# Patient Record
Sex: FEMALE | Race: OTHER | HISPANIC OR LATINO | URBAN - METROPOLITAN AREA
[De-identification: names, ages, dates, MRNs, and addresses within clinical notes are randomized per-mention and may not be internally consistent; named-entity substitution may affect disease eponyms.]

---

## 2020-11-17 ENCOUNTER — OFFICE VISIT (OUTPATIENT)
Dept: URBAN - METROPOLITAN AREA CLINIC 60 | Facility: CLINIC | Age: 50
End: 2020-11-17

## 2020-12-11 ENCOUNTER — OFFICE VISIT (OUTPATIENT)
Dept: URBAN - METROPOLITAN AREA SURGERY CENTER 4 | Facility: SURGERY CENTER | Age: 50
End: 2020-12-11

## 2020-12-28 ENCOUNTER — OFFICE VISIT (OUTPATIENT)
Dept: URBAN - METROPOLITAN AREA CLINIC 63 | Facility: CLINIC | Age: 50
End: 2020-12-28

## 2020-12-28 ENCOUNTER — OFFICE VISIT (OUTPATIENT)
Dept: URBAN - METROPOLITAN AREA CLINIC 60 | Facility: CLINIC | Age: 50
End: 2020-12-28

## 2021-03-19 ENCOUNTER — OFFICE VISIT (OUTPATIENT)
Dept: URBAN - METROPOLITAN AREA SURGERY CENTER 4 | Facility: SURGERY CENTER | Age: 51
End: 2021-03-19

## 2021-03-23 LAB — PATHOLOGY (INDENTED REPORT): (no result)

## 2021-04-01 ENCOUNTER — OFFICE VISIT (OUTPATIENT)
Dept: URBAN - METROPOLITAN AREA CLINIC 60 | Facility: CLINIC | Age: 51
End: 2021-04-01

## 2021-04-19 ENCOUNTER — LAB OUTSIDE AN ENCOUNTER (OUTPATIENT)
Dept: URBAN - METROPOLITAN AREA CLINIC 121 | Facility: CLINIC | Age: 51
End: 2021-04-19

## 2021-04-22 ENCOUNTER — OFFICE VISIT (OUTPATIENT)
Dept: URBAN - METROPOLITAN AREA CLINIC 60 | Facility: CLINIC | Age: 51
End: 2021-04-22

## 2021-04-23 LAB
(TTG) AB, IGA: (no result)
(TTG) AB, IGG: (no result)
AMYLASE: (no result)
ANCA SCREEN: NEGATIVE
GLIADIN (DEAMIDATED) AB (IGA): (no result)
GLIADIN (DEAMIDATED) AB (IGG): (no result)
IMMUNOGLOBULIN A: (no result)
LIPASE: (no result)
MYELOPEROXIDASE ANTIBODY: (no result)
PROTEINASE-3 ANTIBODY: (no result)
SACCHAROMYCES CEREVISIAE AB (ASCA) (: (no result)
SACCHAROMYCES CEREVISIAE AB (ASCA) (: (no result)

## 2021-04-26 ENCOUNTER — LAB OUTSIDE AN ENCOUNTER (OUTPATIENT)
Dept: URBAN - METROPOLITAN AREA CLINIC 121 | Facility: CLINIC | Age: 51
End: 2021-04-26

## 2021-04-26 LAB
CALPROTECTIN, STOOL: (no result)
CLOSTRIDIUM DIFFICILE TOXIN/GDH W/REFL TO: (no result)
PANCREATIC ELASTASE-1: (no result)

## 2021-04-30 LAB
CAMPYLOBACTER, CULTURE: (no result)
FECAL LEUKOCYTE STAIN: (no result)
GIARDIA AG, EIA, STOOL: (no result)
OVA AND PARASITES, CONC AND PERM SMEAR: (no result)
SALMONELLA AND SHIGELLA, CULTURE: (no result)
SHIGA TOXINS, EIA W/RFL TO E.COLI O157: (no result)

## 2021-05-10 ENCOUNTER — LAB OUTSIDE AN ENCOUNTER (OUTPATIENT)
Dept: URBAN - METROPOLITAN AREA CLINIC 121 | Facility: CLINIC | Age: 51
End: 2021-05-10

## 2021-05-14 LAB
FECAL GLOBIN BY IMMUNOCHEMISTRY: (no result)
LACTOFERRIN, QN, STOOL: (no result)

## 2021-05-18 ENCOUNTER — OFFICE VISIT (OUTPATIENT)
Dept: URBAN - METROPOLITAN AREA CLINIC 60 | Facility: CLINIC | Age: 51
End: 2021-05-18

## 2021-07-20 ENCOUNTER — OFFICE VISIT (OUTPATIENT)
Dept: URBAN - METROPOLITAN AREA CLINIC 60 | Facility: CLINIC | Age: 51
End: 2021-07-20

## 2021-08-16 ENCOUNTER — OFFICE VISIT (OUTPATIENT)
Dept: URBAN - METROPOLITAN AREA CLINIC 60 | Facility: CLINIC | Age: 51
End: 2021-08-16

## 2021-08-25 ENCOUNTER — OFFICE VISIT (OUTPATIENT)
Dept: URBAN - METROPOLITAN AREA CLINIC 60 | Facility: CLINIC | Age: 51
End: 2021-08-25

## 2021-09-08 LAB
% SATURATION: (no result)
A-1 ANTITRYPSIN MUTATION: (no result)
ABSOLUTE BASOPHILS: (no result)
ABSOLUTE EOSINOPHILS: (no result)
ABSOLUTE LYMPHOCYTES: (no result)
ABSOLUTE MONOCYTES: (no result)
ABSOLUTE NEUTROPHILS: (no result)
ALBUMIN/GLOBULIN RATIO: (no result)
ALBUMIN: (no result)
ALKALINE PHOSPHATASE: (no result)
ALPHA FETOPROTEIN, TUMOR MARKER: (no result)
ALT: (no result)
AST: (no result)
BASOPHILS: (no result)
BILIRUBIN, TOTAL: (no result)
BUN/CREATININE RATIO: (no result)
CALCIUM: (no result)
CARBON DIOXIDE: (no result)
CERULOPLASMIN: (no result)
CHLORIDE: (no result)
CHOL/HDLC RATIO: (no result)
CHOLESTEROL, TOTAL: (no result)
CREATININE: (no result)
EGFR AFRICAN AMERIC: (no result)
EGFR NON-AFR. AMERI: (no result)
EOSINOPHILS: (no result)
FERRITIN: (no result)
GLOBULIN: (no result)
GLUCOSE: (no result)
HCV RNA, QUANTITATIVE REAL TI: (no result)
HCV RNA, QUANTITATIVE REAL TIME: (no result)
HDL CHOLESTEROL: (no result)
HEMATOCRIT: (no result)
HEMOGLOBIN A1C: (no result)
HEMOGLOBIN: (no result)
HEPATITIS A AB, TOTAL: REACTIVE
HEPATITIS A IGM: (no result)
HEPATITIS B CORE AB TOTAL: (no result)
HEPATITIS B CORE ANTIBODY (IGM): (no result)
HEPATITIS B SURFACE ANTIBODY QL: (no result)
HEPATITIS B SURFACE ANTIGEN: (no result)
IMMUNOGLOBULIN A: (no result)
IMMUNOGLOBULIN G: (no result)
IMMUNOGLOBULIN M: (no result)
INR: 1
IRON BINDING CAPACITY: (no result)
IRON, TOTAL: (no result)
LDL-CHOLESTEROL: (no result)
LYMPHOCYTES: (no result)
Lab: (no result)
MCH: (no result)
MCHC: (no result)
MCV: (no result)
MITOCHONDRIAL AB SCREEN: NEGATIVE
MONOCYTES: (no result)
MPV: (no result)
NEUTROPHILS: (no result)
NON HDL CHOLESTEROL: (no result)
PLATELET COUNT: (no result)
POTASSIUM: (no result)
PROTEIN, TOTAL: (no result)
PT: (no result)
RDW: (no result)
RED BLOOD CELL COUNT: (no result)
REFERRING PHYSICIAN: (no result)
SMOOTH MUSCLE AB SCREEN: NEGATIVE
SODIUM: (no result)
TRIGLYCERIDES: (no result)
UREA NITROGEN (BUN): (no result)
WHITE BLOOD CELL COUNT: (no result)

## 2021-09-21 ENCOUNTER — OFFICE VISIT (OUTPATIENT)
Dept: URBAN - METROPOLITAN AREA CLINIC 60 | Facility: CLINIC | Age: 51
End: 2021-09-21

## 2021-10-21 ENCOUNTER — OFFICE VISIT (OUTPATIENT)
Dept: URBAN - METROPOLITAN AREA CLINIC 121 | Facility: CLINIC | Age: 51
End: 2021-10-21

## 2021-11-30 ENCOUNTER — OFFICE VISIT (OUTPATIENT)
Dept: URBAN - METROPOLITAN AREA CLINIC 60 | Facility: CLINIC | Age: 51
End: 2021-11-30

## 2021-12-17 ENCOUNTER — OFFICE VISIT (OUTPATIENT)
Dept: URBAN - METROPOLITAN AREA CLINIC 60 | Facility: CLINIC | Age: 51
End: 2021-12-17

## 2022-02-10 ENCOUNTER — OFFICE VISIT (OUTPATIENT)
Dept: URBAN - METROPOLITAN AREA SURGERY CENTER 4 | Facility: SURGERY CENTER | Age: 52
End: 2022-02-10

## 2022-02-15 LAB — PATHOLOGY (INDENTED REPORT): (no result)

## 2022-02-24 ENCOUNTER — OFFICE VISIT (OUTPATIENT)
Dept: URBAN - METROPOLITAN AREA SURGERY CENTER 4 | Facility: SURGERY CENTER | Age: 52
End: 2022-02-24

## 2022-03-10 ENCOUNTER — OFFICE VISIT (OUTPATIENT)
Dept: URBAN - METROPOLITAN AREA CLINIC 60 | Facility: CLINIC | Age: 52
End: 2022-03-10

## 2022-04-12 ENCOUNTER — OFFICE VISIT (OUTPATIENT)
Dept: URBAN - METROPOLITAN AREA CLINIC 60 | Facility: CLINIC | Age: 52
End: 2022-04-12

## 2022-04-20 LAB
% SATURATION: (no result)
A-1 ANTITRYPSIN MUTATION: (no result)
ABSOLUTE BASOPHILS: (no result)
ABSOLUTE EOSINOPHILS: (no result)
ABSOLUTE LYMPHOCYTES: (no result)
ABSOLUTE MONOCYTES: (no result)
ABSOLUTE NEUTROPHILS: (no result)
ALBUMIN/GLOBULIN RATIO: (no result)
ALBUMIN: (no result)
ALKALINE PHOSPHATASE: (no result)
ALPHA FETOPROTEIN, TUMOR MARKER: (no result)
ALT: (no result)
AMYLASE: (no result)
ANACHOICE(R) SCREEN: NEGATIVE
AST: (no result)
BASOPHILS: (no result)
BILIRUBIN, TOTAL: (no result)
BUN/CREATININE RATIO: (no result)
CALCIUM: (no result)
CARBON DIOXIDE: (no result)
CERULOPLASMIN: (no result)
CHLORIDE: (no result)
CREATININE: (no result)
EGFR AFRICAN AMERIC: (no result)
EGFR NON-AFR. AMERI: (no result)
EOSINOPHILS: (no result)
FERRITIN: (no result)
GLOBULIN: (no result)
GLUCOSE: (no result)
HCV RNA, QUANTITATIVE REAL TI: (no result)
HCV RNA, QUANTITATIVE REAL TIME: (no result)
HEMATOCRIT: (no result)
HEMOGLOBIN: (no result)
HEPATITIS A AB, TOTAL: REACTIVE
HEPATITIS A IGM: (no result)
HEPATITIS B CORE AB TOTAL: (no result)
HEPATITIS B CORE ANTIBODY (IGM): (no result)
HEPATITIS B SURFACE ANTIBODY QL: (no result)
HEPATITIS B SURFACE ANTIGEN: (no result)
IMMUNOGLOBULIN A: (no result)
IMMUNOGLOBULIN G: (no result)
IMMUNOGLOBULIN M: (no result)
INR: 1
IRON BINDING CAPACITY: (no result)
IRON, TOTAL: (no result)
LIPASE: (no result)
LYMPHOCYTES: (no result)
Lab: (no result)
MCH: (no result)
MCHC: (no result)
MCV: (no result)
MITOCHONDRIAL AB SCREEN: NEGATIVE
MONOCYTES: (no result)
MPV: (no result)
NEUTROPHILS: (no result)
PLATELET COUNT: (no result)
POTASSIUM: (no result)
PROTEIN, TOTAL: (no result)
PT: (no result)
RDW: (no result)
RED BLOOD CELL COUNT: (no result)
REFERRING PHYSICIAN: (no result)
SMOOTH MUSCLE AB SCREEN: NEGATIVE
SODIUM: (no result)
UREA NITROGEN (BUN): (no result)
WHITE BLOOD CELL COUNT: (no result)

## 2022-05-05 ENCOUNTER — OFFICE VISIT (OUTPATIENT)
Dept: URBAN - METROPOLITAN AREA CLINIC 60 | Facility: CLINIC | Age: 52
End: 2022-05-05

## 2022-06-03 ENCOUNTER — LAB OUTSIDE AN ENCOUNTER (OUTPATIENT)
Dept: URBAN - METROPOLITAN AREA CLINIC 121 | Facility: CLINIC | Age: 52
End: 2022-06-03

## 2022-06-06 LAB — HELICOBACTER PYLORI, UREA BREATH TEST: NOT DETECTED

## 2022-06-14 ENCOUNTER — OFFICE VISIT (OUTPATIENT)
Dept: URBAN - METROPOLITAN AREA CLINIC 60 | Facility: CLINIC | Age: 52
End: 2022-06-14

## 2022-07-09 ENCOUNTER — TELEPHONE ENCOUNTER (OUTPATIENT)
Dept: URBAN - METROPOLITAN AREA CLINIC 121 | Facility: CLINIC | Age: 52
End: 2022-07-09

## 2022-07-09 RX ORDER — HYDRALAZINE HYDROCHLORIDE 25 MG/1
TABLET ORAL
Refills: 0 | OUTPATIENT
Start: 2020-11-17 | End: 2021-05-18

## 2022-07-09 RX ORDER — OMEPRAZOLE 20 MG/1
ONCE A DAY CAPSULE, DELAYED RELEASE ORAL ONCE A DAY
Refills: 0 | OUTPATIENT
Start: 2021-09-21 | End: 2021-12-22

## 2022-07-09 RX ORDER — OMEPRAZOLE MAGNESIUM, AMOXICILLIN AND RIFABUTIN 10; 250; 12.5 MG/1; MG/1; MG/1
3 THREE TIMES A DAY CAPSULE, DELAYED RELEASE ORAL
Refills: 0 | OUTPATIENT
Start: 2022-03-10 | End: 2022-03-10

## 2022-07-09 RX ORDER — ERGOCALCIFEROL 1.25 MG/1
ONCE A WEEK CAPSULE ORAL
Refills: 0 | OUTPATIENT
Start: 2021-07-20 | End: 2022-05-05

## 2022-07-10 ENCOUNTER — TELEPHONE ENCOUNTER (OUTPATIENT)
Dept: URBAN - METROPOLITAN AREA CLINIC 121 | Facility: CLINIC | Age: 52
End: 2022-07-10

## 2022-07-10 RX ORDER — OMEPRAZOLE 20 MG/1
TAKE 1 CAPSULE BY MOUTH ONCE DAILY CAPSULE, DELAYED RELEASE ORAL ONCE A DAY
Refills: 0 | Status: ACTIVE | COMMUNITY
Start: 2021-12-22

## 2022-07-10 RX ORDER — OMEPRAZOLE MAGNESIUM, AMOXICILLIN AND RIFABUTIN 10; 250; 12.5 MG/1; MG/1; MG/1
4 THREE TIMES A DAY CAPSULE, DELAYED RELEASE ORAL
Refills: 0 | Status: ACTIVE | COMMUNITY
Start: 2022-03-18

## 2022-07-10 RX ORDER — FLUOXETINE HYDROCHLORIDE 40 MG/1
CAPSULE ORAL
Refills: 0 | Status: ACTIVE | COMMUNITY
Start: 2020-11-17

## 2022-07-10 RX ORDER — SUCRALFATE 1 G/1
TWICE A DAY TABLET ORAL TWICE A DAY
Refills: 0 | Status: ACTIVE | COMMUNITY
Start: 2022-05-05

## 2022-07-28 ENCOUNTER — CLAIMS CREATED FROM THE CLAIM WINDOW (OUTPATIENT)
Dept: URBAN - METROPOLITAN AREA CLINIC 60 | Facility: CLINIC | Age: 52
End: 2022-07-28
Payer: COMMERCIAL

## 2022-07-28 ENCOUNTER — DASHBOARD ENCOUNTERS (OUTPATIENT)
Age: 52
End: 2022-07-28

## 2022-07-28 ENCOUNTER — WEB ENCOUNTER (OUTPATIENT)
Dept: URBAN - METROPOLITAN AREA CLINIC 60 | Facility: CLINIC | Age: 52
End: 2022-07-28

## 2022-07-28 VITALS
HEART RATE: 71 BPM | SYSTOLIC BLOOD PRESSURE: 120 MMHG | TEMPERATURE: 97.6 F | DIASTOLIC BLOOD PRESSURE: 78 MMHG | WEIGHT: 139 LBS | BODY MASS INDEX: 25.58 KG/M2 | HEIGHT: 62 IN | OXYGEN SATURATION: 98 % | RESPIRATION RATE: 20 BRPM

## 2022-07-28 DIAGNOSIS — K76.0 FATTY (CHANGE OF) LIVER, NOT ELSEWHERE CLASSIFIED: ICD-10-CM

## 2022-07-28 DIAGNOSIS — K64.8 OTHER HEMORRHOIDS: ICD-10-CM

## 2022-07-28 DIAGNOSIS — K29.00 ACUTE GASTRITIS WITHOUT BLEEDING: ICD-10-CM

## 2022-07-28 DIAGNOSIS — K57.30 DVRTCLOS OF LG INT W/O PERFORATION OR ABSCESS W/O BLEEDING: ICD-10-CM

## 2022-07-28 DIAGNOSIS — D12.6 BENIGN NEOPLASM OF COLON, UNSPECIFIED: ICD-10-CM

## 2022-07-28 PROCEDURE — 99442 PHONE E/M BY PHYS 11-20 MIN: CPT | Performed by: NURSE PRACTITIONER

## 2022-07-28 RX ORDER — SUCRALFATE 1 G/1
1 TABLET ON AN EMPTY STOMACH TABLET ORAL TWICE A DAY
Qty: 180 TABLET | Refills: 0 | OUTPATIENT
Start: 2022-07-28 | End: 2022-10-26

## 2022-07-28 RX ORDER — MULTIVIT-MIN/IRON/FOLIC ACID/K 18-600-40
AS DIRECTED CAPSULE ORAL
Status: ACTIVE | COMMUNITY

## 2022-07-28 RX ORDER — OMEPRAZOLE 20 MG/1
1 CAPSULE 30 MINUTES BEFORE MORNING MEAL CAPSULE, DELAYED RELEASE ORAL ONCE A DAY
Qty: 90 TABLET | Refills: 0 | OUTPATIENT
Start: 2022-07-28

## 2022-07-28 RX ORDER — CHOLECALCIFEROL (VITAMIN D3) 50 MCG
1 TABLET TABLET ORAL ONCE A DAY
Status: ACTIVE | COMMUNITY

## 2022-07-28 RX ORDER — PNV NO.95/FERROUS FUM/FOLIC AC 28MG-0.8MG
AS DIRECTED TABLET ORAL
Status: ACTIVE | COMMUNITY

## 2022-07-28 RX ORDER — SUCRALFATE 1 G/1
TWICE A DAY TABLET ORAL TWICE A DAY
Refills: 0 | Status: ACTIVE | COMMUNITY
Start: 2022-05-05

## 2022-07-28 RX ORDER — OMEPRAZOLE 20 MG/1
TAKE 1 CAPSULE BY MOUTH ONCE DAILY CAPSULE, DELAYED RELEASE ORAL ONCE A DAY
Refills: 0 | Status: ACTIVE | COMMUNITY
Start: 2021-12-22

## 2022-07-28 RX ORDER — FLUOXETINE HYDROCHLORIDE 40 MG/1
CAPSULE ORAL
Refills: 0 | Status: ACTIVE | COMMUNITY
Start: 2020-11-17

## 2022-07-28 NOTE — HPI-TODAY'S VISIT:
11/20 Patient here today complaining of having chronic diarrhea.  Diarrhea started more than 3 years ago, have no blood, no mucus, not accompanied by abdominal pain or any other GI symptoms. Also, here for her screening colonoscopy patient never had colonoscopy done, denies any personal or family history of colorectal cancer, rectal bleeding. Plan: Will do stool studies, studies to rule out celiac disease and IBD, will do colonoscopy and will try to obtain biopsies of the terminal ileum and random colon biopsies to rule out colitis.  5/21 Patient is in good general state she is complaining today of RUQ abdominal pain. Her colonoscopy shows evidence of a 5 mm serrated adenoma polyp into the transverse colon, mild diverticular disease of the sigmoid colon, and internal hemorrhoids.  The examination of the portion of the ileum was normal and the entire examined colon was normal. Random colon biopsy showed: Benign colonic mucosa without significant distortion.  Biopsy of the terminal ileum shows mucosa without significant distortion or inflammation. Laboratory: Stool studies were negative, amylase lipase and pancreatic elastase were normal, celiac disease panel and IBD panel were negative.  7/21 Patient here today complaining of having diffuse abdominal pain more over the right upper quadrant and right lower quadrant side of the abdomen. This pain is chronic, goes on and off. She denies any other GI symptoms like nausea, vomiting, diarrhea, constipation, no fever she had good general state.  She has right upper quadrant ultrasound that shows evidence of fatty liver. Plan: Patient will have liver work-up and CT scan abdomen and pelvis.  If symptoms do not improve or get worse she will go to the ER department.  9/21 Patient CT abdomen and pelvis showed no acute abnormality in the abdomen or pelvis, 2 cm lesion seen in the endometrial cavity which may represent a large endometrial polyp.  Prior cholecystectomy, mild multilevel spondylosis of the lumbar face.  Abdominal ultrasound done on June 2021 shows evidence of slightly hypoechoic liver parenchyma may be mild early hepatic cyst.   Laboratories: Alpha-fetoprotein, alpha-1 antitrypsin, CBC, CMP, viral hepatitis panel, and autoimmune hepatitis panel are negative.  Lipid panel normal except for slightly low HDL cholesterol, slightly elevated iron 162. Today patient is in good general state she had no GI complaints. Patient reported had  discuss  CT pelvis results with her Gynecology, and she will have surgery in two weeks. Today she is complaining of symptom of gastritis and reflux. Plan: Patient will keep good control of blood sugar, lipids, weight. Vitamin E 400 units daily. Patient will do diet for gastritis and reflux.  A trial with PPI omeprazole 20 mg once a day. We may do EGD if symptoms do not improve.  12/21 Patient here today complaining of having a chronic diffuse abdominal pain, the pain is more towards the epigastric area, right flank, and right lower quadrant. Pain is on and off, not related with p.o. intake, with defecation. She denies any other GI symptoms. Patient has done colonoscopy and CT abdomen pelvis also liver work-up which was negative, and lipase has been negative as well. Patient recently has a procedure done to remove an endometrial polyp which was benign (as per her report).  She is not doing diet for gastritis is not taking the medication that was prescribed to her (omeprazole). Plan: Patient will do diet for gastritis and will have EGD. Omeprazole 20 mg once a day. Lipase and amylase.  3/22 Patient EGD shows evidence of small hiatal hernia, chronic gastritis.  Biopsy results shows evidence of: Duodenal mucosa with no pathologic abnormality.  Gastric antrum/body biopsy with active chronic gastritis positive.  For H. pylori.  Lower third esophageal mucosa with mild acute and chronic inflammation negative for intestinal metaplasia, Candida or dysplasia. Patient also with medical history of fatty liver. Today complaining of having upper abdominal pain, symptoms of gastritis and reflux. Plan: Patient will do diet for gastritis and reflux, treatment for Helicobacter pylori gastritis with Joann, liver work-up.  5/22 Patient  still having some symptoms of gastritis and reflux, she did finish her treatment for H pylori, symptoms are mainly related with meal intake. Patient will continue with treatment and diet for gastritis and reflux, Urea breath test. 7/22 Patient here today in good general state.  She says still having some symptoms of gastritis and reflux but, they are better.  Patient's urea breath test is negative.  She denies any other GI symptoms.